# Patient Record
Sex: MALE | Race: ASIAN
[De-identification: names, ages, dates, MRNs, and addresses within clinical notes are randomized per-mention and may not be internally consistent; named-entity substitution may affect disease eponyms.]

---

## 2019-04-30 ENCOUNTER — HOSPITAL ENCOUNTER (EMERGENCY)
Dept: HOSPITAL 4 - SED | Age: 34
Discharge: HOME | End: 2019-04-30
Payer: MEDICAID

## 2019-04-30 VITALS — BODY MASS INDEX: 24.33 KG/M2 | HEIGHT: 67 IN | WEIGHT: 155 LBS | SYSTOLIC BLOOD PRESSURE: 124 MMHG

## 2019-04-30 VITALS — SYSTOLIC BLOOD PRESSURE: 119 MMHG
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
## 2019-04-30 DIAGNOSIS — S61.313A: Primary | ICD-10-CM
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
## 2019-04-30 PROCEDURE — 11730 AVULSION NAIL PLATE SIMPLE 1: CPT

## 2019-04-30 PROCEDURE — 73140 X-RAY EXAM OF FINGER(S): CPT

## 2019-04-30 PROCEDURE — 99284 EMERGENCY DEPT VISIT MOD MDM: CPT

## 2019-04-30 NOTE — NUR
Patient given written and verbal discharge instructions and verbalizes 
understanding.  ER NP Bonnie discussed with patient the results and treatment 
provided. Patient in stable condition. ID arm band removed. Rx of Augmentin, 
Bacitracin, Motrin given. Patient educated on pain management and to follow up 
with PMD. Pain Scale 0. Opportunity for questions provided and answered. 
Medication side effect fact sheet provided.

## 2019-04-30 NOTE — NUR
Pt AAOx4 ambulated intoED c/o pain to L 3rd digit s/p dog bite by sister's dog 
prior to arrival. Pt reports dog is vaccinated and he is up to date with 
tetanus shot. No other injuries/complaints per pt/noted. Will continue to 
monitor.

## 2019-04-30 NOTE — NUR
NP Bonnie at bedside removing L 3rd digit nail bed using sterile technique. Pt 
tolerated procedure well. Bacitracin applied to site, wrapped in non-adherent 
dressing with finger splint.